# Patient Record
Sex: FEMALE | Employment: OTHER | URBAN - METROPOLITAN AREA
[De-identification: names, ages, dates, MRNs, and addresses within clinical notes are randomized per-mention and may not be internally consistent; named-entity substitution may affect disease eponyms.]

---

## 2017-01-13 ENCOUNTER — THERAPY VISIT (OUTPATIENT)
Dept: PHYSICAL THERAPY | Facility: CLINIC | Age: 52
End: 2017-01-13
Payer: COMMERCIAL

## 2017-01-13 DIAGNOSIS — M54.42 CHRONIC LEFT-SIDED LOW BACK PAIN WITH LEFT-SIDED SCIATICA: Primary | Chronic | ICD-10-CM

## 2017-01-13 DIAGNOSIS — G89.29 CHRONIC LEFT-SIDED LOW BACK PAIN WITH LEFT-SIDED SCIATICA: Primary | Chronic | ICD-10-CM

## 2017-01-13 PROCEDURE — 97110 THERAPEUTIC EXERCISES: CPT | Mod: GP | Performed by: PHYSICAL THERAPIST

## 2017-01-13 PROCEDURE — 97161 PT EVAL LOW COMPLEX 20 MIN: CPT | Mod: GP | Performed by: PHYSICAL THERAPIST
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
## 2017-01-19 ENCOUNTER — THERAPY VISIT (OUTPATIENT)
Dept: PHYSICAL THERAPY | Facility: CLINIC | Age: 52
End: 2017-01-19
Payer: COMMERCIAL

## 2017-01-19 DIAGNOSIS — M54.42 CHRONIC LEFT-SIDED LOW BACK PAIN WITH LEFT-SIDED SCIATICA: Primary | ICD-10-CM

## 2017-01-19 DIAGNOSIS — G89.29 CHRONIC LEFT-SIDED LOW BACK PAIN WITH LEFT-SIDED SCIATICA: Primary | ICD-10-CM

## 2017-01-19 PROCEDURE — 97140 MANUAL THERAPY 1/> REGIONS: CPT | Mod: GP | Performed by: PHYSICAL THERAPIST

## 2017-01-19 PROCEDURE — 97110 THERAPEUTIC EXERCISES: CPT | Mod: GP | Performed by: PHYSICAL THERAPIST

## 2017-01-25 ENCOUNTER — THERAPY VISIT (OUTPATIENT)
Dept: PHYSICAL THERAPY | Facility: CLINIC | Age: 52
End: 2017-01-25
Payer: COMMERCIAL

## 2017-01-25 DIAGNOSIS — M54.42 CHRONIC LEFT-SIDED LOW BACK PAIN WITH LEFT-SIDED SCIATICA: Primary | ICD-10-CM

## 2017-01-25 DIAGNOSIS — G89.29 CHRONIC LEFT-SIDED LOW BACK PAIN WITH LEFT-SIDED SCIATICA: Primary | ICD-10-CM

## 2017-01-25 PROCEDURE — 97140 MANUAL THERAPY 1/> REGIONS: CPT | Mod: GP | Performed by: PHYSICAL THERAPIST

## 2017-01-25 PROCEDURE — 97110 THERAPEUTIC EXERCISES: CPT | Mod: GP | Performed by: PHYSICAL THERAPIST

## 2017-02-08 ENCOUNTER — THERAPY VISIT (OUTPATIENT)
Dept: PHYSICAL THERAPY | Facility: CLINIC | Age: 52
End: 2017-02-08
Payer: COMMERCIAL

## 2017-02-08 DIAGNOSIS — M54.42 CHRONIC LEFT-SIDED LOW BACK PAIN WITH LEFT-SIDED SCIATICA: Primary | ICD-10-CM

## 2017-02-08 DIAGNOSIS — G89.29 CHRONIC LEFT-SIDED LOW BACK PAIN WITH LEFT-SIDED SCIATICA: Primary | ICD-10-CM

## 2017-02-08 PROCEDURE — 97140 MANUAL THERAPY 1/> REGIONS: CPT | Mod: GP | Performed by: PHYSICAL THERAPIST

## 2017-02-08 PROCEDURE — 97110 THERAPEUTIC EXERCISES: CPT | Mod: GP | Performed by: PHYSICAL THERAPIST

## 2017-10-16 PROBLEM — G89.29 CHRONIC LEFT-SIDED LOW BACK PAIN WITH LEFT-SIDED SCIATICA: Status: RESOLVED | Noted: 2017-01-13 | Resolved: 2017-10-16

## 2017-10-16 PROBLEM — M54.42 CHRONIC LEFT-SIDED LOW BACK PAIN WITH LEFT-SIDED SCIATICA: Status: RESOLVED | Noted: 2017-01-13 | Resolved: 2017-10-16

## 2017-10-16 NOTE — PROGRESS NOTES
Subjective:    HPI                    Objective:    System    Physical Exam    General     ROS    Assessment/Plan:      DISCHARGE NOTE    Progress reporting period is from 1/13/17 to 2/8/17.     Jodie failed to return for next follow up visit and current status is unknown.  Please see information below for last relevant information on current status.  Patient seen for Rxs Used: 4 visits.  SUBJECTIVE  Subjective changes noted by patient:  Subjective: Pt notes her neck has been feeling better over past 2 weeks, but feels L hip pain is the same. Was sick last week so unable to perform all of HEP. 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  Current pain level is Current Pain level: 2/10 (current).     Previous pain level was  Initial Pain level: 8/10.   Changes in function:  Yes (See Goal flowsheet attached for changes in current functional level)  Adverse reaction to treatment or activity: None    OBJECTIVE  Changes noted in objective findings: Objective: (+) Active SLR L, (+) RAKAN L, (+) Torsion L; Tender/ hypertonic L glute med, piriformis; Hamstring length: L lacking 37, R lacking 27     ASSESSMENT/PLAN  Diagnosis: L sciatica   DIAGP:  The encounter diagnosis was Chronic left-sided low back pain with left-sided sciatica.  Updated problem list and treatment plan:   Pain - HEP  Decreased ROM/flexibility - HEP  Decreased strength - HEP  Decreased joint mobility - HEP  STG/LTGs have been met or progress has been made towards goals:  Yes, please see goal flowsheet for most current information  Assessment of Progress: current status is unknown.  Last current status: Assessment of progress: Pt is progressing as expected   Self Management Plans:  HEP  I have re-evaluated this patient and find that the nature, scope, duration and intensity of the therapy is appropriate for the medical condition of the patient.  Jodie continues to require the following intervention to meet STG and LTG's:   HEP.    Recommendations:  As this patient failed to complete ordered appointments, she will be discharged from therapy with current home program.  Patient to follow up with MD as needed.    Please refer to the daily flowsheet for treatment today, total treatment time and time spent performing 1:1 timed codes.

## 2018-08-28 ENCOUNTER — TRANSFERRED RECORDS (OUTPATIENT)
Dept: PHYSICAL THERAPY | Facility: CLINIC | Age: 53
End: 2018-08-28

## 2018-09-12 ENCOUNTER — THERAPY VISIT (OUTPATIENT)
Dept: PHYSICAL THERAPY | Facility: CLINIC | Age: 53
End: 2018-09-12
Payer: COMMERCIAL

## 2018-09-12 DIAGNOSIS — M54.2 CERVICALGIA: Primary | ICD-10-CM

## 2018-09-12 PROCEDURE — 97140 MANUAL THERAPY 1/> REGIONS: CPT | Mod: GP | Performed by: PHYSICAL THERAPIST

## 2018-09-12 PROCEDURE — 97161 PT EVAL LOW COMPLEX 20 MIN: CPT | Mod: GP | Performed by: PHYSICAL THERAPIST

## 2018-09-12 PROCEDURE — 97110 THERAPEUTIC EXERCISES: CPT | Mod: GP | Performed by: PHYSICAL THERAPIST
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
## 2018-09-12 NOTE — MR AVS SNAPSHOT
"              After Visit Summary   9/12/2018    Jodie Talamantes    MRN: [id number]           Patient Information     Date Of Birth          1965        Visit Information        Provider Department      9/12/2018 10:40 AM Kimberly Villalobos PT [facility]        Today's Diagnoses     Cervicalgia    -  1       Follow-ups after your visit        Your next 10 appointments already scheduled     Sep 21, 2018 12:50 PM CDT   [facility] with Amanda K Hilligoss, PT   [facility] ([facility]  )    [address]  [facility] [address] [phone]              Who to contact     If you have questions or need follow up information about today's clinic visit or your schedule please contact [facility] directly at [phone].  Normal or non-critical lab and imaging results will be communicated to you by [employer] [place] [employer], letter or phone within 4 business days after the clinic has received the results. If you do not hear from us within 7 days, please contact the clinic through [place] [employer] or phone. If you have a critical or abnormal lab result, we will notify you by phone as soon as possible.  Submit refill requests through [employer] or call your pharmacy and they will forward the refill request to us. Please allow 3 business days for your refill to be completed.          Additional Information About Your Visit        [place] [employer] Information     [employer] lets you send messages to your doctor, view your test results, renew your prescriptions, schedule appointments and more. To sign up, go to [email or web address] . Click on \"Log in\" on the left side of the screen, which will take you to the Welcome page. Then click on \"Sign up Now\" on the right side of the page.     You will be asked to enter the access code listed below, as well as some personal information. Please follow the " directions to create your username and password.     Your access code is: [email or web address]  Expires: 2018 12:16 PM     Your access code will  in 90 days. If you need help or a new code, please call your [place] clinic or [phone].        Care EveryWhere ID     This is your Care EveryWhere ID. This could be used by other organizations to access your [place] medical records  [id number]         Blood Pressure from Last 3 Encounters:   No data found for BP    Weight from Last 3 Encounters:   No data found for Wt              We Performed the Following     HC PT EVAL, LOW COMPLEXITY     [facility] INITIAL EVAL REPORT     MANUAL THER TECH,1+REGIONS,EA 15 MIN     THERAPEUTIC EXERCISES        Primary Care Provider    Provider Not In System                Equal Access to Services     SONIA LEAL : Hadii dea cruzo Samuel, waaxda luqadaha, qaybta kaalmada adeegyada, krystal tatum . So [facility] [phone].    ATENCIÓN: Si habla español, tiene a guevara disposición servicios gratuitos de asistencia lingüística. Llame al [phone].    We comply with applicable federal civil rights laws and [place] laws. We do not discriminate on the basis of race, color, national origin, age, disability, sex, sexual orientation, or gender identity.            Thank you!     Thank you for choosing [facility] PHYSICAL THERAP[facility]  for your care. Our goal is always to provide you with excellent care. Hearing back from our patients is one way we can continue to improve our services. Please take a few minutes to complete the written survey that you may receive in the mail after your visit with us. Thank you!             Your Updated Medication List - Protect others around you: Learn how to safely use, store and throw away your medicines at www.disposemymeds.org.      Notice  As of 2018 12:16 PM    You have not been prescribed any medications.

## 2018-09-21 ENCOUNTER — THERAPY VISIT (OUTPATIENT)
Dept: PHYSICAL THERAPY | Facility: CLINIC | Age: 53
End: 2018-09-21
Payer: COMMERCIAL

## 2018-09-21 DIAGNOSIS — M54.50 LUMBAGO: Primary | ICD-10-CM

## 2018-09-21 PROCEDURE — 97140 MANUAL THERAPY 1/> REGIONS: CPT | Mod: GP | Performed by: PHYSICAL THERAPIST

## 2018-09-21 PROCEDURE — 97161 PT EVAL LOW COMPLEX 20 MIN: CPT | Mod: GP | Performed by: PHYSICAL THERAPIST

## 2018-09-21 NOTE — LETTER
"[facility]  [address]  [facility] [id number] [phone]    2018    Re: Jodie Talamantes   :   1965  MRN:  [id number]   REFERRING PHYSICIAN:   Avinash Taveras    [facility]    Date of Initial Evaluation:  18  Visits:  Rxs Used: 1 (neck not treated today)  Reason for Referral:  Lumbago    EVALUATION SUMMARY    [facility] Initial Evaluation  Subjective:  Patient is a 53 year old female presenting with rehab back hpi. The history is provided by the patient. No  was used.   Jodie Talamantes is a 53 year old female with a lumbar condition.  Condition occurred with:  Insidious onset.    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 Recent exacerbation 2018 after prolonged driving. .    Patient reports pain:  Lower lumbar spine (L>R currently).  Radiates to:  Gluteals left, thigh right, knee left, lower leg left and foot left (intermittently down R LE).  Pain is described as aching (\"deep aching nerve pain\") and is intermittent and reported as 2/10 and 9/10 (depending on time of day and activity level).  Associated symptoms:  Loss of motion/stiffness. Pain is the same all the time.  Symptoms are exacerbated by bending, walking, standing and lifting and relieved by activity/movement, NSAID's and rest.  Since onset symptoms are gradually worsening.  Special tests:  MRI (L4-5 and L5-S1 disc protrusions).  Previous treatment includes physical therapy and chiropractic.  There was no and moderate (no improvement w/ chiropractic; moderate improvement w/ PT) improvement following previous treatment.  General health as reported by patient is good.                Past medical history: " heart problems, overweight, headaches, dizziness, pain at night/ rest, numbness/ tingling, menopausal.  Medical allergies: no.  Other surgeries include:  No (upcoming foot surgery 10/11/2018).  Medication history: anti inflammatory, metropolol, muscle relaxants.  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      Lives at home w/  and adult daughter.     Barriers include:  None as reported by the patient.     Red flags:  None as reported by the patient.     Exercise: very active with farm (horse, ducks and chickens).      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                  Objective:       Lumbar/SI Evaluation  ROM:    AROM Lumbar:   Flexion:            To mid lower leg ++pain L  Ext:                    80% limited ++pain L   Side Bend:        Left:  Mid lower leg ++pain L    Right:  Mid lower leg ++pain/pull L  Rotation:           Left:  Min imitation     Right:  Min limitation +pain on return   Side Glide:        Left:     Right:        Lumbar Myotomes:  Lumbar myotomes: WNL/EQ B.  T12-L3 (Hip Flex):  Left: 5-        Neural Tension/Mobility:    Left side:  SLR and Slump positive.     Spinal Segmental Conclusions: (+) RAKAN L  (+) Active SLR L  (+) Torsion L  (+) L posterior innominate w/ upslip  After MET  Less pain w/ RAKAN L  (-) Active SLR  (-) Torsion  (-) SI malalignment  Ext 50% limited w/ less pain  SB to Knee B w/ less pain    Assessment/Plan:    Patient is a 53 year old female with lumbar complaints.    Patient has the following significant findings with corresponding treatment plan.                Diagnosis 1:  Low Back pain w/ radiculpathy  Pain -  hot/cold therapy, manual therapy, self management, education, directional preference exercise and home program  Decreased ROM/flexibility - manual therapy, therapeutic exercise, therapeutic activity and home program  Decreased joint mobility - manual therapy, therapeutic  exercise, therapeutic activity and home program  Decreased strength - therapeutic exercise, therapeutic activities and home program  Impaired muscle performance - neuro re-education and home program  Decreased function - therapeutic activities and home program    Therapy Evaluation Codes:   1) History comprised of:   Personal factors that impact the plan of care:      Age, Past/current experiences, Profession and Time since onset of symptoms.    Comorbidity factors that impact the plan of care are:      Dizziness, Heart problems, Menopausal, Migraines/headaches and Overweight.     Medications impacting care: Anti-inflammatory, Muscle relaxant and Pain.  2) Examination of Body Systems comprised of:   Body structures and functions that impact the plan of care:      Lumbar spine, Pelvis and Sacral illiac joint.   Activity limitations that impact the plan of care are:      Bathing, Bending, Cooking, Driving, Dressing, Lifting, Sitting, Stairs, Standing, Walking, Working and Sleeping.  3) Clinical presentation characteristics are:   Stable/Uncomplicated.  4) Decision-Making    Low complexity using standardized patient assessment instrument and/or measureable assessment of functional outcome.  Cumulative Therapy Evaluation is: Low complexity.    Previous and current functional limitations:  (See Goal Flow Sheet for this information)    Short term and Long term goals: (See Goal Flow Sheet for this information)     Communication ability:  Patient appears to be able to clearly communicate and understand verbal and written communication and follow directions correctly.  Treatment Explanation - The following has been discussed with the patient:   RX ordered/plan of care  Anticipated outcomes  Possible risks and side effects  This patient would benefit from PT intervention to resume normal activities.   Rehab potential is good.    Frequency:  1 X week, once daily  Duration:  for 8 weeks  Discharge Plan:  Achieve all  LTG.  Independent in home treatment program.  Reach maximal therapeutic benefit.      Thank you for your referral.    INQUIRIES  Therapist: Amanda Hilligoss DPT  [facility]  [address] [place] [address] [address]  [facility] [address]  Phone: [phone]  Fax: [phone]

## 2018-09-21 NOTE — MR AVS SNAPSHOT
"              After Visit Summary   9/21/2018    Jodie Talamantes    MRN: [id number]           Patient Information     Date Of Birth          1965        Visit Information        Provider Department      9/21/2018 12:50 PM Hilligoss, Amanda K, PT [facility]        Today's Diagnoses     Lumbago    -  1       Follow-ups after your visit        Your next 10 appointments already scheduled     Sep 28, 2018  1:30 PM CDT   [facility] with Amanda K Hilligoss, PT   [facility] ([facility] [place]  )    [address]  [facility] [address] [phone]              Who to contact     If you have questions or need follow up information about today's clinic visit or your schedule please contact [facility] PHYSICAL THERAP[facility] directly at [phone].  Normal or non-critical lab and imaging results will be communicated to you by [employer], letter or phone within 4 business days after the clinic has received the results. If you do not hear from us within 7 days, please contact the clinic through [employer] or phone. If you have a critical or abnormal lab result, we will notify you by phone as soon as possible.  Submit refill requests through [employer] or call your pharmacy and they will forward the refill request to us. Please allow 3 business days for your refill to be completed.          Additional Information About Your Visit        [employer] Information     [employer] lets you send messages to your doctor, view your test results, renew your prescriptions, schedule appointments and more. To sign up, go to [email or web address] . Click on \"Log in\" on the left side of the screen, which will take you to the Welcome page. Then click on \"Sign up Now\" on the right side of the page.     You will be asked to enter the access code listed below, as well as some personal information. Please follow the " directions to create your username and password.     Your access code is: [email or web address]  Expires: 2018 12:16 PM     Your access code will  in 90 days. If you need help or a new code, please call your [facility] or [phone].        Care EveryWhere ID     This is your Care EveryWhere ID. This could be used by other organizations to access your [place] medical records  [id number]         Blood Pressure from Last 3 Encounters:   No data found for BP    Weight from Last 3 Encounters:   No data found for Wt              We Performed the Following     HC PT EVAL, LOW COMPLEXITY     [facility] INITIAL EVAL REPORT     MANUAL THER TECH,1+REGIONS,EA 15 MIN        Primary Care Provider Office Phone # Fax #    [facility] [phone]       [facility] [address] [facility] [address]        Equal Access to Services     [facility] Hospital[facility] : Hadii aad ku hadasho Soomaali, waaxda luqadaha, qaybta kaalmada adeegyada, waxay elizabethin hayaan adekody tatum . So [facility] [phone].    ATENCIÓN: Si habla español, tiene a guevara disposición servicios gratuitos de asistencia lingüística. Llame al [phone].    We comply with applicable federal civil rights laws and [place] laws. We do not discriminate on the basis of race, color, national origin, age, disability, sex, sexual orientation, or gender identity.            Thank you!     Thank you for choosing [facility] PHYSICAL THERAPY  for your care. Our goal is always to provide you with excellent care. Hearing back from our patients is one way we can continue to improve our services. Please take a few minutes to complete the written survey that you may receive in the mail after your visit with us. Thank you!             Your Updated Medication List - Protect others around you: Learn how to safely use, store and throw away your medicines at www.disposemymeds.org.      Notice  As of 2018  1:52 PM    You  have not been prescribed any medications.

## 2018-09-21 NOTE — PROGRESS NOTES
"[facility] Initial Evaluation  Subjective:  Patient is a 53 year old female presenting with rehab back hpi. The history is provided by the patient. No  was used.   Jodie Talamantes is a 53 year old female with a lumbar condition.  Condition occurred with:  Insidious onset.    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 Recent exacerbation July 2018 after prolonged driving. .    Patient reports pain:  Lower lumbar spine (L>R currently).  Radiates to:  Gluteals left, thigh right, knee left, lower leg left and foot left (intermittently down R LE).  Pain is described as aching (\"deep aching nerve pain\") and is intermittent and reported as 2/10 and 9/10 (depending on time of day and activity level).  Associated symptoms:  Loss of motion/stiffness. Pain is the same all the time.  Symptoms are exacerbated by bending, walking, standing and lifting and relieved by activity/movement, NSAID's and rest.  Since onset symptoms are gradually worsening.  Special tests:  MRI (L4-5 and L5-S1 disc protrusions).  Previous treatment includes physical therapy and chiropractic.  There was no and moderate (no improvement w/ chiropractic; moderate improvement w/ PT) improvement following previous treatment.  General health as reported by patient is good.                          Past medical history: heart problems, overweight, headaches, dizziness, pain at night/ rest, numbness/ tingling, menopausal.  Medical allergies: no.  Other surgeries include:  No (upcoming foot surgery 10/11/2018).  Medication history: anti inflammatory, metropolol, muscle relaxants.  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      Lives at home w/  and adult daughter.     Barriers include:  None as reported by the " patient.     Red flags:  None as reported by the patient.     Exercise: very active with farm (horse, ducks and chickens).      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                    Objective:  System         Lumbar/SI Evaluation  ROM:    AROM Lumbar:   Flexion:            To mid lower leg ++pain L  Ext:                    80% limited ++pain L   Side Bend:        Left:  Lower thigh ++pain L    Right:  Lower thigh ++pain/pull L  Rotation:           Left:  Min imitation     Right:  Min limitation +pain on return   Side Glide:        Left:     Right:           Lumbar Myotomes:  Lumbar myotomes: WNL/EQ B.  T12-L3 (Hip Flex):  Left: 5-                    Neural Tension/Mobility:    Left side:  SLR and Slump positive.           Spinal Segmental Conclusions: (+) RAKAN L  (+) Active SLR L  (+) Torsion L  (+) L posterior innominate w/ upslip  After MET  Less pain w/ RAKAN L  (-) Active SLR  (-) Torsion  (-) SI malalignment  Ext 50% limited w/ less pain  SB to Knee B w/ less pain                                                       General     ROS    Assessment/Plan:    Patient is a 53 year old female with lumbar complaints.    Patient has the following significant findings with corresponding treatment plan.                Diagnosis 1:  Low Back pain w/ radiculpathy  Pain -  hot/cold therapy, manual therapy, self management, education, directional preference exercise and home program  Decreased ROM/flexibility - manual therapy, therapeutic exercise, therapeutic activity and home program  Decreased joint mobility - manual therapy, therapeutic exercise, therapeutic activity and home program  Decreased strength - therapeutic exercise, therapeutic activities and home program  Impaired muscle performance - neuro re-education and home program  Decreased function - therapeutic activities and home program    Therapy Evaluation Codes:   1) History comprised of:   Personal factors that impact the  plan of care:      Age, Past/current experiences, Profession and Time since onset of symptoms.    Comorbidity factors that impact the plan of care are:      Dizziness, Heart problems, Menopausal, Migraines/headaches and Overweight.     Medications impacting care: Anti-inflammatory, Muscle relaxant and Pain.  2) Examination of Body Systems comprised of:   Body structures and functions that impact the plan of care:      Lumbar spine, Pelvis and Sacral illiac joint.   Activity limitations that impact the plan of care are:      Bathing, Bending, Cooking, Driving, Dressing, Lifting, Sitting, Stairs, Standing, Walking, Working and Sleeping.  3) Clinical presentation characteristics are:   Stable/Uncomplicated.  4) Decision-Making    Low complexity using standardized patient assessment instrument and/or measureable assessment of functional outcome.  Cumulative Therapy Evaluation is: Low complexity.    Previous and current functional limitations:  (See Goal Flow Sheet for this information)    Short term and Long term goals: (See Goal Flow Sheet for this information)     Communication ability:  Patient appears to be able to clearly communicate and understand verbal and written communication and follow directions correctly.  Treatment Explanation - The following has been discussed with the patient:   RX ordered/plan of care  Anticipated outcomes  Possible risks and side effects  This patient would benefit from PT intervention to resume normal activities.   Rehab potential is good.    Frequency:  1 X week, once daily  Duration:  for 8 weeks  Discharge Plan:  Achieve all LTG.  Independent in home treatment program.  Reach maximal therapeutic benefit.    Please refer to the daily flowsheet for treatment today, total treatment time and time spent performing 1:1 timed codes.

## 2018-09-28 ENCOUNTER — THERAPY VISIT (OUTPATIENT)
Dept: PHYSICAL THERAPY | Facility: CLINIC | Age: 53
End: 2018-09-28
Payer: COMMERCIAL

## 2018-09-28 DIAGNOSIS — M54.2 CERVICALGIA: ICD-10-CM

## 2018-09-28 DIAGNOSIS — M54.40 BILATERAL LOW BACK PAIN WITH SCIATICA, SCIATICA LATERALITY UNSPECIFIED, UNSPECIFIED CHRONICITY: ICD-10-CM

## 2018-09-28 PROCEDURE — 97140 MANUAL THERAPY 1/> REGIONS: CPT | Mod: GP | Performed by: PHYSICAL THERAPIST

## 2018-09-28 PROCEDURE — 97110 THERAPEUTIC EXERCISES: CPT | Mod: GP | Performed by: PHYSICAL THERAPIST

## 2018-10-05 ENCOUNTER — THERAPY VISIT (OUTPATIENT)
Dept: PHYSICAL THERAPY | Facility: CLINIC | Age: 53
End: 2018-10-05
Payer: COMMERCIAL

## 2018-10-05 DIAGNOSIS — M54.40 BILATERAL LOW BACK PAIN WITH SCIATICA, SCIATICA LATERALITY UNSPECIFIED, UNSPECIFIED CHRONICITY: ICD-10-CM

## 2018-10-05 DIAGNOSIS — M54.2 CERVICALGIA: ICD-10-CM

## 2018-10-05 PROCEDURE — 97110 THERAPEUTIC EXERCISES: CPT | Mod: GP | Performed by: PHYSICAL THERAPIST

## 2018-10-05 PROCEDURE — 97112 NEUROMUSCULAR REEDUCATION: CPT | Mod: GP | Performed by: PHYSICAL THERAPIST

## 2018-10-05 PROCEDURE — 97140 MANUAL THERAPY 1/> REGIONS: CPT | Mod: GP | Performed by: PHYSICAL THERAPIST

## 2018-10-05 NOTE — MR AVS SNAPSHOT
"              After Visit Summary   10/5/2018    Jodie Talamantes    MRN: [id number]           Patient Information     Date Of Birth          1965        Visit Information        Provider Department      10/5/2018 1:30 PM Hilligoss, Amanda K, PT [facility]        Today's Diagnoses     Bilateral low back pain with sciatica, sciatica laterality unspecified, unspecified chronicity        Cervicalgia           Follow-ups after your visit        Who to contact     If you have questions or need follow up information about today's clinic visit or your schedule please contact [facility] directly at [phone].  Normal or non-critical lab and imaging results will be communicated to you by MyChart, letter or phone within 4 business days after the clinic has received the results. If you do not hear from us within 7 days, please contact the clinic through MyChart or phone. If you have a critical or abnormal lab result, we will notify you by phone as soon as possible.  Submit refill requests through [employer] or call your pharmacy and they will forward the refill request to us. Please allow 3 business days for your refill to be completed.          Additional Information About Your Visit        MyChart Information     [employer] lets you send messages to your doctor, view your test results, renew your prescriptions, schedule appointments and more. To sign up, go to [email or web address] . Click on \"Log in\" on the left side of the screen, which will take you to the Welcome page. Then click on \"Sign up Now\" on the right side of the page.     You will be asked to enter the access code listed below, as well as some personal information. Please follow the directions to create your username and password.     Your access code is: [email or web address]  Expires: 2018 12:16 PM     Your access code will  in 90 days. If you need help or " a new code, please call your [facility] clinic or [phone].        Care EveryWhere ID     This is your Care EveryWhere ID. This could be used by other organizations to access your [facility] medical records  [id number]         Blood Pressure from Last 3 Encounters:   No data found for BP    Weight from Last 3 Encounters:   No data found for Wt              We Performed the Following     [facility] PROGRESS NOTES REPORT     MANUAL THER TECH,1+REGIONS,EA 15 MIN     NEUROMUSCULAR RE-EDUCATION     THERAPEUTIC EXERCISES        Primary Care Provider Office Phone # Fax #    [facility] [phone]       [facility] [address]  [facility] [address]        Equal Access to Services     [facility] Center[facility] : Hadii aad ku hadasho Soomaali, waaxda luqadaha, qaybta kaalmada adeegyada, waxay elizabethin haydmitriyn cassie tatum . So [facility] [phone].    ATENCIÓN: Si habla español, tiene a guevara disposición servicios gratuitos de asistencia lingüística. [facility] [phone].    We comply with applicable federal civil rights laws and [place] laws. We do not discriminate on the basis of race, color, national origin, age, disability, sex, sexual orientation, or gender identity.            Thank you!     Thank you for choosing [facility]  for your care. Our goal is always to provide you with excellent care. Hearing back from our patients is one way we can continue to improve our services. Please take a few minutes to complete the written survey that you may receive in the mail after your visit with us. Thank you!             Your Updated Medication List - Protect others around you: Learn how to safely use, store and throw away your medicines at www.disposemymeds.org.      Notice  As of 10/5/2018  3:58 PM    You have not been prescribed any medications.

## 2018-10-05 NOTE — LETTER
[facility]  [address]  [facility] [id number] [phone]    2018    Re: Jodie Talamantes   :   1965  MRN:  [id number]   REFERRING PHYSICIAN:   Avinash Taveras    [facility]  Date of Initial Evaluation:  18  Visits:  Rxs Used: 3  Reason for Referral:     Bilateral low back pain with sciatica, sciatica laterality unspecified, unspecified chronicity  Cervicalgia    PROGRESS  REPORT  Progress reporting period is from 18 to 10/5/18.       SUBJECTIVE  Pt states her lower back and neck have both had less pain over past week. Feels mobilizations at last visit significantly improved cervical ROM. Also recieved cortisone injection 10/1/18. Unsure if cortisone injection has changed anything yet. Planning to have foot surgery 10/11/18 so unsure when she will be able to return to continue therapy for neck and low back. Will continue HEP independently for now as allowed by MD.    Current Pain level: 3/10 (in neck and low back; no pain medication).     Previous pain level was  9/10  .   Changes in function:  Yes (See Goal flowsheet attached for changes in current functional level)  Adverse reaction to treatment or activity: None    OBJECTIVE  Cervical ROM: flex 50, ext 50, R SB 20, L SB 28, R rot 56, L rot 58   Lumbar AROM: Flex mid lower leg, Ext 40% limited, SB L to knee, R to knee   (-) SI Tests, Hip Flex 5-/5 B, Abd 4+/5 B     ASSESSMENT/PLAN  Updated problem list and treatment plan: Diagnosis 1:  Low Back pain  Pain -  manual therapy, self management, education, directional preference exercise and home program  Decreased ROM/flexibility - manual therapy, therapeutic exercise, therapeutic activity and home program  Decreased joint mobility - manual therapy, therapeutic exercise, therapeutic activity and home program  Decreased strength - therapeutic exercise, therapeutic  activities and home program  Impaired muscle performance - neuro re-education and home program  Decreased function - therapeutic activities and home program  Diagnosis 2:  Neck pain   Pain -  mechanical traction, manual therapy, self management, education, directional preference exercise and home program  Decreased ROM/flexibility - manual therapy, therapeutic exercise, therapeutic activity and home program  Decreased strength - therapeutic exercise, therapeutic activities and home program  Impaired muscle performance - neuro re-education and home program  Decreased function - therapeutic activities and home program  Impaired posture - neuro re-education, therapeutic activities and home program  STG/LTGs have been met or progress has been made towards goals:  Yes (See Goal flow sheet completed today.)  Assessment of Progress: The patient's condition is improving.  Self Management Plans:  Patient has been instructed in a home treatment program.  Patient  has been instructed in self management of symptoms.  I have re-evaluated this patient and find that the nature, scope, duration and intensity of the therapy is appropriate for the medical condition of the patient.  Jodie continues to require the following intervention to meet STG and LTG's:  PT intervention is no longer required to meet STG/LTG.    Recommendations:  This patient would benefit from continued therapy when allowed by surgeon. Will continue w/ HEP for now and reassess recommendations when patient returns to PT.        Thank you for your referral.    INQUIRIES  Therapist: Amanda Hilligoss DPT  [facility]  [address] [place] [address]. [phone]  [facility] [address]  Phone: [phone]  Fax: [phone]

## 2018-10-05 NOTE — PROGRESS NOTES
PROGRESS  REPORT    Progress reporting period is from 9/21/18 to 10/5/18.       SUBJECTIVE  Pt states her lower back and neck have both had less pain over past week. Feels mobilizations at last visit significantly improved cervical ROM. Also recieved cortisone injection 10/1/18. Unsure if cortisone injection has changed anything yet. Planning to have foot surgery 10/11/18 so unsure when she will be able to return to continue therapy for neck and low back. Will continue HEP independently for now as allowed by MD.    Current Pain level: 3/10 (in neck and low back; no pain medication).     Previous pain level was  9/10  .   Changes in function:  Yes (See Goal flowsheet attached for changes in current functional level)  Adverse reaction to treatment or activity: None    OBJECTIVE  Cervical ROM: flex 50, ext 50, R SB 20, L SB 28, R rot 56, L rot 58   Lumbar AROM: Flex mid lower leg, Ext 40% limited, SB L to knee, R to knee   (-) SI Tests, Hip Flex 5-/5 B, Abd 4+/5 B     ASSESSMENT/PLAN  Updated problem list and treatment plan: Diagnosis 1:  Low Back pain  Pain -  manual therapy, self management, education, directional preference exercise and home program  Decreased ROM/flexibility - manual therapy, therapeutic exercise, therapeutic activity and home program  Decreased joint mobility - manual therapy, therapeutic exercise, therapeutic activity and home program  Decreased strength - therapeutic exercise, therapeutic activities and home program  Impaired muscle performance - neuro re-education and home program  Decreased function - therapeutic activities and home program  Diagnosis 2:  Neck pain   Pain -  mechanical traction, manual therapy, self management, education, directional preference exercise and home program  Decreased ROM/flexibility - manual therapy, therapeutic exercise, therapeutic activity and home program  Decreased strength - therapeutic exercise, therapeutic activities and home program  Impaired muscle  Mom aware immunization record will be ready for pickup after 3. Mom already on way to pick it up. Advised it will be at  for .    performance - neuro re-education and home program  Decreased function - therapeutic activities and home program  Impaired posture - neuro re-education, therapeutic activities and home program  STG/LTGs have been met or progress has been made towards goals:  Yes (See Goal flow sheet completed today.)  Assessment of Progress: The patient's condition is improving.  Self Management Plans:  Patient has been instructed in a home treatment program.  Patient  has been instructed in self management of symptoms.  I have re-evaluated this patient and find that the nature, scope, duration and intensity of the therapy is appropriate for the medical condition of the patient.  Jodie continues to require the following intervention to meet STG and LTG's:  PT intervention is no longer required to meet STG/LTG.    Recommendations:  This patient would benefit from continued therapy when allowed by surgeon. Will continue w/ HEP for now and reassess recommendations when patient returns to PT.        Please refer to the daily flowsheet for treatment today, total treatment time and time spent performing 1:1 timed codes.

## 2019-04-17 PROBLEM — M54.50 LUMBAGO: Status: RESOLVED | Noted: 2018-09-21 | Resolved: 2019-04-17

## 2019-04-17 PROBLEM — M54.2 CERVICALGIA: Status: RESOLVED | Noted: 2018-09-12 | Resolved: 2019-04-17

## 2019-04-17 NOTE — PROGRESS NOTES
Patient did not return for further treatment and no additional progress was noted.  Please refer to the progress note and goal flowsheet completed on 10/05/18 for discharge information.